# Patient Record
Sex: FEMALE | Race: WHITE | NOT HISPANIC OR LATINO | Employment: OTHER | URBAN - METROPOLITAN AREA
[De-identification: names, ages, dates, MRNs, and addresses within clinical notes are randomized per-mention and may not be internally consistent; named-entity substitution may affect disease eponyms.]

---

## 2017-11-14 ENCOUNTER — TRANSCRIBE ORDERS (OUTPATIENT)
Dept: ADMINISTRATIVE | Facility: HOSPITAL | Age: 37
End: 2017-11-14

## 2017-11-14 DIAGNOSIS — R53.83 TIRED: Primary | ICD-10-CM

## 2017-11-21 ENCOUNTER — APPOINTMENT (OUTPATIENT)
Dept: NUCLEAR MEDICINE | Facility: HOSPITAL | Age: 37
End: 2017-11-21
Attending: FAMILY MEDICINE

## 2017-11-22 ENCOUNTER — APPOINTMENT (OUTPATIENT)
Dept: NUCLEAR MEDICINE | Facility: HOSPITAL | Age: 37
End: 2017-11-22
Attending: FAMILY MEDICINE

## 2017-11-28 ENCOUNTER — APPOINTMENT (OUTPATIENT)
Dept: NUCLEAR MEDICINE | Facility: HOSPITAL | Age: 37
End: 2017-11-28
Attending: FAMILY MEDICINE

## 2017-11-29 ENCOUNTER — APPOINTMENT (OUTPATIENT)
Dept: NUCLEAR MEDICINE | Facility: HOSPITAL | Age: 37
End: 2017-11-29
Attending: FAMILY MEDICINE

## 2017-12-05 ENCOUNTER — HOSPITAL ENCOUNTER (OUTPATIENT)
Dept: NUCLEAR MEDICINE | Facility: HOSPITAL | Age: 37
Discharge: HOME | End: 2017-12-05
Attending: FAMILY MEDICINE

## 2017-12-05 DIAGNOSIS — R53.83 TIRED: ICD-10-CM

## 2017-12-05 PROCEDURE — 0 SODIUM IODIDE 3.7 MBQ CAPSULE: Performed by: FAMILY MEDICINE

## 2017-12-05 PROCEDURE — A9516 IODINE I-123 SOD IODIDE MIC: HCPCS | Performed by: FAMILY MEDICINE

## 2017-12-05 RX ORDER — SODIUM IODIDE I 123 100 UCI/1
1 CAPSULE, GELATIN COATED ORAL
Status: COMPLETED | OUTPATIENT
Start: 2017-12-05 | End: 2017-12-05

## 2017-12-05 RX ADMIN — Medication 1 CAPSULE: at 13:55

## 2017-12-06 ENCOUNTER — HOSPITAL ENCOUNTER (OUTPATIENT)
Dept: NUCLEAR MEDICINE | Facility: HOSPITAL | Age: 37
Discharge: HOME | End: 2017-12-06
Attending: FAMILY MEDICINE

## 2017-12-06 PROCEDURE — 78014 THYROID IMAGING W/BLOOD FLOW: CPT

## 2019-03-19 ENCOUNTER — OFFICE VISIT (OUTPATIENT)
Dept: OBSTETRICS AND GYNECOLOGY | Facility: CLINIC | Age: 39
End: 2019-03-19

## 2019-03-19 VITALS
SYSTOLIC BLOOD PRESSURE: 144 MMHG | DIASTOLIC BLOOD PRESSURE: 80 MMHG | HEIGHT: 63 IN | WEIGHT: 218 LBS | BODY MASS INDEX: 38.62 KG/M2

## 2019-03-19 DIAGNOSIS — R87.619 ABNORMAL CYTOLOGICAL FINDING IN SPECIMEN FROM CERVIX UTERI: ICD-10-CM

## 2019-03-19 DIAGNOSIS — Z01.419 VISIT FOR GYNECOLOGIC EXAMINATION: Primary | ICD-10-CM

## 2019-03-19 DIAGNOSIS — N76.0 VULVOVAGINITIS: ICD-10-CM

## 2019-03-19 DIAGNOSIS — Z30.432 ENCOUNTER FOR IUD REMOVAL: ICD-10-CM

## 2019-03-19 PROCEDURE — 58301 REMOVE INTRAUTERINE DEVICE: CPT | Performed by: OBSTETRICS & GYNECOLOGY

## 2019-03-19 PROCEDURE — G0101 CA SCREEN;PELVIC/BREAST EXAM: HCPCS | Performed by: OBSTETRICS & GYNECOLOGY

## 2019-03-19 RX ORDER — CYANOCOBALAMIN 1000 UG/ML
INJECTION, SOLUTION INTRAMUSCULAR; SUBCUTANEOUS
Refills: 1 | COMMUNITY
Start: 2019-03-17

## 2019-03-19 RX ORDER — PIOGLITAZONEHYDROCHLORIDE 30 MG/1
TABLET ORAL
Refills: 1 | COMMUNITY
Start: 2019-02-15

## 2019-03-19 RX ORDER — OXYCODONE AND ACETAMINOPHEN 10; 325 MG/1; MG/1
TABLET ORAL
Refills: 0 | COMMUNITY
Start: 2019-03-16

## 2019-03-19 RX ORDER — OSELTAMIVIR PHOSPHATE 75 MG/1
75 CAPSULE ORAL 2 TIMES DAILY
Refills: 0 | COMMUNITY
Start: 2019-01-08

## 2019-03-19 RX ORDER — OXYMORPHONE HYDROCHLORIDE 40 MG/1
40 TABLET, FILM COATED, EXTENDED RELEASE ORAL 2 TIMES DAILY
Refills: 0 | COMMUNITY
Start: 2019-03-16

## 2019-03-19 RX ORDER — ERGOCALCIFEROL 1.25 MG/1
CAPSULE ORAL
Refills: 0 | COMMUNITY
Start: 2019-03-17

## 2019-03-19 RX ORDER — METHYLPHENIDATE HYDROCHLORIDE 20 MG/1
TABLET ORAL
Refills: 0 | COMMUNITY
Start: 2019-03-16

## 2019-03-19 RX ORDER — HYDROCHLOROTHIAZIDE 12.5 MG/1
TABLET ORAL
Refills: 1 | COMMUNITY
Start: 2019-03-17

## 2019-03-19 RX ORDER — LEVOTHYROXINE SODIUM 0.05 MG/1
TABLET ORAL
Refills: 0 | COMMUNITY
Start: 2019-03-17

## 2019-03-19 NOTE — PROGRESS NOTES
"[facility]  [address], [address]  [place], [place] [address]  Phone: [phone] / Fax:  [phone]      2019    [address] [facility] [address]    Reyes, Rosenberg Acosta, MD    Chief Complaint   Patient presents with   • Gynecologic Exam     Np Annual last visit 10 years ago, Patient has Mirena placed 10 years ago as well. She is wanting removed. Patient c/o vaginal odor after intercourse.       Jyotsna Jordan is here for annual gynecologic exam.  HPI - Patient needing IUD removed as it has been in place for 10 years.  Her  had a vasectomy.  Patient with risks of abnormal pap and pap to be performed.  Has not had a mammogram.  Patient states PCP suggested she have \"hormone levels\" checked because of decreased libido.    Past Medical History:   Diagnosis Date   • Diabetes mellitus (CMS/[facility])    • Fibromyalgia    • Hashimoto's disease    • Multiple sclerosis (CMS/[facility])    • Occipital neuralgia    • Osteoarthritis (arthritis due to wear and tear of joints)    • Osteopenia    • Rheumatoid arthritis (CMS/[facility])    • Urinary tract infection        Past Surgical History:   Procedure Laterality Date   • ANKLE SURGERY Left    • BONE GRAFT     •  SECTION     • RHINOPLASTY     • TONSILLECTOMY         Allergies   Allergen Reactions   • Augmentin [Amoxicillin-Pot Clavulanate] Hives   • Cefaclor Hives   • Lac Bovis Hives       Social History     Socioeconomic History   • Marital status:      Spouse name: Not on file   • Number of children: Not on file   • Years of education: Not on file   • Highest education level: Not on file   Social Needs   • Financial resource strain: Not on file   • Food insecurity - worry: Not on file   • Food insecurity - inability: Not on file   • Transportation needs - medical: Not on file   • Transportation needs - non-medical: Not on file   Occupational History   • Not on file   Tobacco Use   • Smoking status: Former Smoker     Types: Electronic " "Cigarette   Substance and Sexual Activity   • Alcohol use: No     Frequency: Never   • Drug use: No   • Sexual activity: Yes     Birth control/protection: IUD     Comment: vasectomy   Other Topics Concern   • Not on file   Social History Narrative   • Not on file       Family History   Problem Relation Age of Onset   • Hypertension Father    • Angina Mother    • Hypertension Mother    • Thyroid disease Mother    • Hashimoto's thyroiditis Mother    • Cancer Paternal Grandmother        No LMP recorded. Patient is not currently having periods (Reason: Other).    OB History      Para Term  AB Living    4       1      SAB TAB Ectopic Molar Multiple Live Births            1 3          Vitals:    19 1104   BP: 144/80   Weight: 98.9 kg (218 lb)   Height: 160 cm (63\")       Physical Exam   Constitutional: She appears well-developed and well-nourished.   Genitourinary: Vagina normal and uterus normal. Pelvic exam was performed with patient supine. There is no tenderness or lesion on the right labia. There is no tenderness or lesion on the left labia. Right adnexum does not display tenderness and does not display fullness. Left adnexum does not display tenderness and does not display fullness.   Cervix exhibits visible IUD strings. Cervix does not exhibit motion tenderness.   HENT:   Right Ear: External ear normal.   Left Ear: External ear normal.   Nose: Nose normal.   Eyes: Conjunctivae are normal.   Neck: Normal range of motion. Neck supple. No thyromegaly present.   Cardiovascular: Normal rate, regular rhythm and normal heart sounds.   Pulmonary/Chest: Effort normal. No stridor. She has no wheezes. Right breast exhibits no mass and no nipple discharge. Left breast exhibits no mass and no nipple discharge.   Abdominal: Soft. There is no tenderness. There is no guarding.   Musculoskeletal: Normal range of motion. She exhibits no edema.   Neurological: She is alert. She displays normal reflexes. " Coordination normal.   Skin: Skin is warm and dry.   Psychiatric: She has a normal mood and affect. Her behavior is normal. Judgment and thought content normal.   Vitals reviewed.  IUD Removal Procedure Note    Type of IUD:  Mirena  Date of insertion:  10 years ago  Reason for removal:  Device expiration  Other relevant history/information:  none    Procedure Time Out Documentation      Procedure Details  IUD strings visible:  yes  Local anesthesia:  none  Tenaculum used:  None  Removal:  IUD strings grasped and IUD removed intact with gentle traction.  The patient tolerated the procedure well.    All appropriate instructions regarding removal were reviewed.    Patient tolerated the procedure well without complications.    Plans for contraception:  vasectomy    Other follow-up needed:  none    The patient was advised to call for any fever or for prolonged or severe pain or bleeding. She was advised to use NSAID as needed for mild to moderate pain.       Jyotsna was seen today for gynecologic exam.    Diagnoses and all orders for this visit:    Visit for gynecologic examination  -     IgP, Aptima HPV  -     Discussed importance of regular screening and breast awareness.  Mammogram ordered.  -     After IUD removed, if libido or other issues persist beyond 3 months, consider work up.  -      encouraged to proceed with vasectomy follow up.  Encounter for IUD removal        -     Uncomplicated IUD removal.  Vulvovaginitis  -     NuSwab VG+ - Swab, Vagina  -     Await culture results.      Ian Garibay MD

## 2019-03-21 LAB
CYTOLOGIST CVX/VAG CYTO: NORMAL
CYTOLOGY CVX/VAG DOC THIN PREP: NORMAL
DX ICD CODE: NORMAL
HIV 1 & 2 AB SER-IMP: NORMAL
HPV I/H RISK 4 DNA CVX QL PROBE+SIG AMP: NEGATIVE
OTHER STN SPEC: NORMAL
PATH REPORT.FINAL DX SPEC: NORMAL
STAT OF ADQ CVX/VAG CYTO-IMP: NORMAL

## 2019-03-22 LAB
A VAGINAE DNA VAG QL NAA+PROBE: NORMAL SCORE
BVAB2 DNA VAG QL NAA+PROBE: NORMAL SCORE
C ALBICANS DNA VAG QL NAA+PROBE: NEGATIVE
C GLABRATA DNA VAG QL NAA+PROBE: NEGATIVE
C TRACH RRNA SPEC QL NAA+PROBE: NEGATIVE
MEGA1 DNA VAG QL NAA+PROBE: NORMAL SCORE
N GONORRHOEA RRNA SPEC QL NAA+PROBE: NEGATIVE
T VAGINALIS RRNA SPEC QL NAA+PROBE: NEGATIVE

## 2019-03-27 ENCOUNTER — TELEPHONE (OUTPATIENT)
Dept: OBSTETRICS AND GYNECOLOGY | Facility: CLINIC | Age: 39
End: 2019-03-27

## 2019-03-27 NOTE — TELEPHONE ENCOUNTER
Patient aware of normal results. 3-27-19/lw  ----- Message from Ian Garibay MD sent at 3/25/2019  3:36 PM EDT -----  LAW - Let her know that her pap test and her vaginal cultures were normal.

## 2021-02-25 ENCOUNTER — TELEPHONE (OUTPATIENT)
Dept: ORTHOPEDIC SURGERY | Facility: CLINIC | Age: 41
End: 2021-02-25

## 2024-07-23 ENCOUNTER — OFFICE VISIT (OUTPATIENT)
Dept: OBSTETRICS AND GYNECOLOGY | Facility: CLINIC | Age: 44
End: 2024-07-23
Payer: MEDICARE

## 2024-07-23 VITALS
WEIGHT: 203 LBS | HEIGHT: 63 IN | DIASTOLIC BLOOD PRESSURE: 74 MMHG | SYSTOLIC BLOOD PRESSURE: 132 MMHG | BODY MASS INDEX: 35.97 KG/M2

## 2024-07-23 DIAGNOSIS — Z01.419 VISIT FOR GYNECOLOGIC EXAMINATION: Primary | ICD-10-CM

## 2024-07-23 DIAGNOSIS — N91.5 HYPOMENORRHEA/OLIGOMENORRHEA: ICD-10-CM

## 2024-07-23 NOTE — PROGRESS NOTES
[facility]  [address], [address]  [place], [place] [address]  Phone: [phone] / Fax:  [phone]      2024    [address] [facility] [address]    Reyes, Rosenberg Acosta, MD    Chief Complaint   Patient presents with    Gynecologic Exam     NP Annual exam, last pap 3-19-NL HPV (-).. Patient states prior to her LMP she had not had a cycle for 6 weeks.       Jyotsna Jordan is here for annual gynecologic exam.  HPI - Patient with normal pap 5 years ago.  She has not had a cycle in 6 weeks and has some menopause symptoms.  She requests evaluation.  Patient has never had a mammogram.    Past Medical History:   Diagnosis Date    Diabetes mellitus     Fibromyalgia     Hashimoto's disease     Multiple sclerosis     Occipital neuralgia     Osteoarthritis (arthritis due to wear and tear of joints)     Osteopenia     Rheumatoid arthritis     Urinary tract infection        Past Surgical History:   Procedure Laterality Date    ANKLE SURGERY Left     BONE GRAFT       SECTION      RHINOPLASTY      TONSILLECTOMY         Allergies   Allergen Reactions    Augmentin [Amoxicillin-Pot Clavulanate] Hives    Cefaclor Hives    Milk (Cow) Hives    Amoxicillin Hives       Social History     Socioeconomic History    Marital status:    Tobacco Use    Smoking status: Former     Types: Electronic Cigarette   Vaping Use    Vaping status: Former   Substance and Sexual Activity    Alcohol use: No    Drug use: No     Comment: HEMP THC    Sexual activity: Yes     Birth control/protection: Vasectomy     Comment: vasectomy       Family History   Problem Relation Age of Onset    Hypertension Father     Angina Mother     Hypertension Mother     Thyroid disease Mother     Hashimoto's thyroiditis Mother     Reece's disease Brother     Heart failure Paternal Grandfather     Epilepsy Paternal Grandfather     Uterine cancer Paternal Grandmother     Ovarian cancer Paternal Grandmother     Cancer Paternal  "Grandmother     Hypertension Maternal Grandmother     Other Maternal Grandmother         Covid    COPD Maternal Grandmother     [place]'s disease Maternal Grandmother     Breast cancer Neg Hx     Colon cancer Neg Hx        Patient's last menstrual period was 2024 (exact date).    OB History          3    Para        Term                AB   1    Living             SAB        IAB        Ectopic        Molar        Multiple   1    Live Births   4                Vitals:    24 1125   BP: 132/74   Weight: 92.1 kg (203 lb)   Height: 160 cm (63\")       Physical Exam  Constitutional:       Appearance: Normal appearance. She is well-developed.   Genitourinary:      Bladder and urethral meatus normal.      Right Labia: No tenderness or lesions.     Left Labia: No tenderness or lesions.     No vaginal discharge or tenderness.        Right Adnexa: not tender and not full.     Left Adnexa: not tender and not full.     No cervical motion tenderness or lesion.      Uterus is not enlarged or tender.      No urethral tenderness or hypermobility present.   Breasts:     Right: No mass or nipple discharge.      Left: No mass or nipple discharge.   HENT:      Right Ear: External ear normal.      Left Ear: External ear normal.      Nose: Nose normal.   Eyes:      Conjunctiva/sclera: Conjunctivae normal.   Neck:      Thyroid: No thyromegaly.   Cardiovascular:      Rate and Rhythm: Normal rate and regular rhythm.      Heart sounds: Normal heart sounds.   Pulmonary:      Effort: Pulmonary effort is normal.      Breath sounds: No stridor. No wheezing.   Abdominal:      Palpations: Abdomen is soft.      Tenderness: There is no abdominal tenderness. There is no guarding or rebound.   Musculoskeletal:         General: Normal range of motion.      Cervical back: Normal range of motion and neck supple.   Neurological:      Mental Status: She is alert.      Coordination: Coordination normal.   Skin:     General: Skin is " warm and dry.   Psychiatric:         Mood and Affect: Mood normal.         Behavior: Behavior normal.         Thought Content: Thought content normal.         Judgment: Judgment normal.   Vitals reviewed. Exam conducted with a chaperone present.         Diagnoses and all orders for this visit:    1. Visit for gynecologic examination (Primary)  -     IgP, Aptima HPV  -     Discussed importance of regular screening and breast awareness.  -     Patient to schedule mammogram.    2. Hypomenorrhea/oligomenorrhea  -     Follicle Stimulating Hormone  -     Discussed implications of early menopause.  Check FSH today.        Ian Garibay MD

## 2024-07-24 LAB — FSH SERPL-ACNC: 5.2 MIU/ML

## 2024-07-25 ENCOUNTER — TELEPHONE (OUTPATIENT)
Dept: OBSTETRICS AND GYNECOLOGY | Facility: CLINIC | Age: 44
End: 2024-07-25
Payer: MEDICARE

## 2024-07-25 LAB
CYTOLOGIST CVX/VAG CYTO: NORMAL
CYTOLOGY CVX/VAG DOC CYTO: NORMAL
CYTOLOGY CVX/VAG DOC THIN PREP: NORMAL
DX ICD CODE: NORMAL
HPV I/H RISK 4 DNA CVX QL PROBE+SIG AMP: NEGATIVE
Lab: NORMAL
OTHER STN SPEC: NORMAL
STAT OF ADQ CVX/VAG CYTO-IMP: NORMAL

## 2024-07-26 NOTE — TELEPHONE ENCOUNTER
Spoke with Jyotsna to let her know that Dr Garibay said you are not in menopause and your pap is normal.She had read her results on My Chart.

## 2024-08-02 ENCOUNTER — PROCEDURE VISIT (OUTPATIENT)
Dept: OBSTETRICS AND GYNECOLOGY | Facility: CLINIC | Age: 44
End: 2024-08-02
Payer: COMMERCIAL

## 2024-08-02 DIAGNOSIS — Z12.31 VISIT FOR SCREENING MAMMOGRAM: Primary | ICD-10-CM

## 2024-08-13 ENCOUNTER — HOSPITAL ENCOUNTER (EMERGENCY)
Facility: HOSPITAL | Age: 44
Discharge: HOME | End: 2024-08-13
Attending: EMERGENCY MEDICINE
Payer: COMMERCIAL

## 2024-08-13 ENCOUNTER — APPOINTMENT (OUTPATIENT)
Dept: GENERAL RADIOLOGY | Facility: HOSPITAL | Age: 44
End: 2024-08-13
Payer: COMMERCIAL

## 2024-08-13 VITALS
BODY MASS INDEX: 35.44 KG/M2 | DIASTOLIC BLOOD PRESSURE: 90 MMHG | TEMPERATURE: 97.8 F | HEART RATE: 62 BPM | WEIGHT: 200 LBS | HEIGHT: 63 IN | SYSTOLIC BLOOD PRESSURE: 130 MMHG | RESPIRATION RATE: 18 BRPM | OXYGEN SATURATION: 99 %

## 2024-08-13 DIAGNOSIS — M25.561 ACUTE PAIN OF BOTH KNEES: Primary | ICD-10-CM

## 2024-08-13 DIAGNOSIS — M25.562 ACUTE PAIN OF BOTH KNEES: Primary | ICD-10-CM

## 2024-08-13 PROCEDURE — 73560 X-RAY EXAM OF KNEE 1 OR 2: CPT

## 2024-08-13 PROCEDURE — 99283 EMERGENCY DEPT VISIT LOW MDM: CPT

## 2024-08-13 PROCEDURE — 96372 THER/PROPH/DIAG INJ SC/IM: CPT

## 2024-08-13 PROCEDURE — 25010000002 KETOROLAC TROMETHAMINE PER 15 MG: Performed by: PHYSICIAN ASSISTANT

## 2024-08-13 RX ORDER — KETOROLAC TROMETHAMINE 30 MG/ML
30 INJECTION, SOLUTION INTRAMUSCULAR; INTRAVENOUS ONCE
Status: COMPLETED | OUTPATIENT
Start: 2024-08-13 | End: 2024-08-13

## 2024-08-13 RX ORDER — DICLOFENAC SODIUM 75 MG/1
75 TABLET, DELAYED RELEASE ORAL 2 TIMES DAILY PRN
Qty: 20 TABLET | Refills: 0 | Status: SHIPPED | OUTPATIENT
Start: 2024-08-13

## 2024-08-13 RX ADMIN — KETOROLAC TROMETHAMINE 30 MG: 30 INJECTION, SOLUTION INTRAMUSCULAR at 12:52

## 2024-08-13 NOTE — ED PROVIDER NOTES
EMERGENCY DEPARTMENT ENCOUNTER    Room Number:  S06/06  Date of encounter:  8/13/2024  PCP: Reyes, Rosenberg Acosta, MD  Historian: Patient, family  Chronic or social conditions impacting care (social determinants of health): Nothing    HPI:  Chief Complaint: Bilateral knee pain  A complete HPI/ROS/PMH/PSH/SH/FH are unobtainable due to: Nothing    Context: Jyotsna Jordan is a 44 y.o. female with a history of diabetes, fibromyalgia, multiple sclerosis, who presents to the ED c/o acute bilateral knee pain.  Patient states she has had bilateral knee pain for several months.  She has been following an orthopedic knee clinic and been receiving injections.  Two days ago she states she was going up the steps and she felt a sharp pop in her right knee.  Since that time she has had worsening right knee pain.  She has had difficulty ambulating secondary to pain.  She initially went to the [facility] care [place] and came here for further evaluation.  She denies any numbness or tingling distal to the knees.  She denies any calf pain or swelling.    States that she has been on narcotics for several years but would like to avoid all narcotics.    Review of prior external notes (non-ED):   I reviewed GYN office visit from 7/23/2024.  Patient seen for routine care.    Review of prior external test results outside of this encounter:  Reviewed a CMP from 5/3/2014.  Creatinine 0.68, potassium 3.6    PAST MEDICAL HISTORY  Active Ambulatory Problems     Diagnosis Date Noted    No Active Ambulatory Problems     Resolved Ambulatory Problems     Diagnosis Date Noted    No Resolved Ambulatory Problems     Past Medical History:   Diagnosis Date    Diabetes mellitus     Fibromyalgia     Hashimoto's disease     Multiple sclerosis     Occipital neuralgia     Osteoarthritis (arthritis due to wear and tear of joints)     Osteopenia     Rheumatoid arthritis     Urinary tract infection          PAST SURGICAL HISTORY  Past Surgical History:    Procedure Laterality Date    ANKLE SURGERY Left     BONE GRAFT       SECTION      RHINOPLASTY      TONSILLECTOMY           FAMILY HISTORY  Family History   Problem Relation Age of Onset    Hypertension Father     Angina Mother     Hypertension Mother     Thyroid disease Mother     Hashimoto's thyroiditis Mother     Reece's disease Brother     Heart failure Paternal Grandfather     Epilepsy Paternal Grandfather     Uterine cancer Paternal Grandmother     Ovarian cancer Paternal Grandmother     Cancer Paternal Grandmother     Hypertension Maternal Grandmother     Other Maternal Grandmother         Covid    COPD Maternal Grandmother     Reece's disease Maternal Grandmother     Breast cancer Neg Hx     Colon cancer Neg Hx          SOCIAL HISTORY  Social History     Socioeconomic History    Marital status:    Tobacco Use    Smoking status: Former     Types: Electronic Cigarette   Vaping Use    Vaping status: Former   Substance and Sexual Activity    Alcohol use: No    Drug use: No     Comment: HEMP THC    Sexual activity: Yes     Birth control/protection: Vasectomy     Comment: vasectomy         ALLERGIES  Augmentin [amoxicillin-pot clavulanate], Cefaclor, Milk (cow), and Amoxicillin        REVIEW OF SYSTEMS  All systems reviewed and negative except for those discussed in HPI.       PHYSICAL EXAM    I have reviewed the triage vital signs and nursing notes.    ED Triage Vitals   Temp Heart Rate Resp BP SpO2   24 1147 24 1147 24 1147 24 1156 24 1147   97.8 °F (36.6 °C) 73 16 (!) 157/110 99 %      Temp src Heart Rate Source Patient Position BP Location FiO2 (%)   -- 24 1156 24 1156 24 1156 --    Monitor Sitting Left arm        Physical Exam  GENERAL: Alert, oriented, not distressed  HENT: head atraumatic, no nuchal rigidity  EYES: no scleral icterus, EOMI  CV: regular rhythm, regular rate, no murmur  RESPIRATORY: normal effort, CTA  ABDOMEN: soft,  nontender  MUSCULOSKELETAL: Diffuse right knee tenderness and mild swelling.  No deformity.  Decreased range of motion secondary to pain.  No obvious laxity.  Neurovascularly intact distally.  NEURO: alert, moves all extremities, follows commands  SKIN: warm, dry    RADIOLOGY  XR Knee 1 or 2 View Bilateral    Result Date: 8/13/2024  XR KNEE 1 OR 2 VW BILATERAL-  INDICATIONS: Pain.  TECHNIQUE: 5 VIEWS INCLUDING THE BILATERAL KNEES  COMPARISON: None available  FINDINGS:  No acute fracture, erosion, dislocation, or knee effusion is noted. Minimal degenerative spurring is seen in the right knee. If there is clinical suspicion for internal derangement of the knee, MRI could be considered for further evaluation.       As described.    This report was finalized on 8/13/2024 1:23 PM by Dr. Rudy Ma M.D on Workstation: [employer]       I ordered the above noted radiological studies. Reviewed by me and discussed with radiologist.  See dictation for official radiology interpretation.      MEDICATIONS GIVEN IN ER    Medications   ketorolac (TORADOL) injection 30 mg (30 mg Intramuscular Given 8/13/24 1252)         ADDITIONAL ORDERS CONSIDERED BUT NOT ORDERED:  Admission was considered but after careful review of the patient's presentation, physical examination, diagnostic results, and response to treatment the patient may be safely discharged with outpatient follow-up.       PROGRESS, DATA ANALYSIS, CONSULTS, AND MEDICAL DECISION MAKING    All labs have been independently interpreted by myself.  All radiology studies have been independently interpreted by myself and discussed with radiologist dictating the report.   EKG's independently interpreted by myself.  Discussion below represents my analysis of pertinent findings related to patient's condition, differential diagnosis, treatment plan and final disposition.    I have discussed case with Dr. Hector, emergency room physician.  He has performed his own bedside examination  and agrees with treatment plan.    ED Course as of 08/13/24 1945   Tue Aug 13, 2024   1235 Patient presents with acute on chronic bilateral knee pain.  Patient has a history of osteoarthritis in her bilateral knees.  She has been receiving injections.  She felt a pop in her right knee 2 days ago with worsening pain.  She denies any numbness or tingling distally.  Compartments are soft in her calves bilaterally.  Neurovascularly intact distally.  Plan for x-rays, pain control.  She states  that she would like to avoid all narcotics. [EE]   1326 Bilateral knee images independently interpreted myself show no evidence of acute fracture. [EE]   1421 Updated patient on workup.  We will discharge her with a knee immobilizer on the right.  We will refer her to orthopedics. [EE]      ED Course User Index  [EE] Ceferino Strong PA       AS OF 19:45 EDT VITALS:    BP - 130/90  HR - 62  TEMP - 97.8 °F (36.6 °C)  O2 SATS - 99%        DIAGNOSIS  Final diagnoses:   Acute pain of both knees         DISPOSITION  Discharged    Admission was considered but after careful review of the patient's presentation, physical examination, diagnostic results, and response to treatment the patient may be safely discharged with outpatient follow-up.         Dictated utilizing Dragon dictation     Ceferino Strong PA  08/13/24 1945

## 2024-08-13 NOTE — ED PROVIDER NOTES
Pt presents to the ED c/o bilateral knee pain over last several months, had acute on chronic pain in the right knee going up some steps earlier today.  Pain is worse with flexion of the knee, improves when it is straight.  No fall or direct trauma.     On exam,   General: No acute distress, nontoxic  HEENT: EOMI  Pulm: Symmetric chest rise, nonlabored breathing  CV: Regular rate and rhythm  GI: Nondistended  MSK: No deformity, range of motion reduced due to pain, no overt effusion or erythema or abnormal warmth  Skin: Warm, dry  Neuro: Awake, alert, oriented x 4, moving all extremities, no focal deficits  Psych: Calm, cooperative    Vital signs and nursing notes reviewed.           Plan: Plan for x-ray and supportive care and reevaluate with results.  Concern for possible underlying aggravation of arthritis, tendinitis, fracture, dislocation, among others.      ED Course as of 08/13/24 1838   Tue Aug 13, 2024   1235 Patient presents with acute on chronic bilateral knee pain.  Patient has a history of osteoarthritis in her bilateral knees.  She has been receiving injections.  She felt a pop in her right knee 2 days ago with worsening pain.  She denies any numbness or tingling distally.  Compartments are soft in her calves bilaterally.  Neurovascularly intact distally.  Plan for x-rays, pain control.  She states  that she would like to avoid all narcotics. [EE]   1326 Bilateral knee images independently interpreted myself show no evidence of acute fracture. [EE]   1421 Updated patient on workup.  We will discharge her with a knee immobilizer on the right.  We will refer her to orthopedics. [EE]      ED Course User Index  [EE] Ceferino Strong PA       Reassuring imaging, discharged with a knee immobilizer and supportive care and outpatient orthopedic follow-up.  ED return for worsening symptoms as needed.     MD Attestation Note    SHARED VISIT: This visit was performed by BOTH a physician and an APC. The substantive  portion of the medical decision making was performed by this attesting physician who made or approved the management plan and takes responsibility for patient management. All studies in the APC note (if performed) were independently interpreted by me.                   Asa Hector MD  08/13/24 4626